# Patient Record
Sex: FEMALE
[De-identification: names, ages, dates, MRNs, and addresses within clinical notes are randomized per-mention and may not be internally consistent; named-entity substitution may affect disease eponyms.]

---

## 2023-08-15 ENCOUNTER — APPOINTMENT (OUTPATIENT)
Dept: ANTEPARTUM | Facility: CLINIC | Age: 23
End: 2023-08-15

## 2023-08-18 ENCOUNTER — ASOB RESULT (OUTPATIENT)
Age: 23
End: 2023-08-18

## 2023-08-18 ENCOUNTER — APPOINTMENT (OUTPATIENT)
Dept: ANTEPARTUM | Facility: CLINIC | Age: 23
End: 2023-08-18
Payer: MEDICAID

## 2023-08-18 PROBLEM — Z00.00 ENCOUNTER FOR PREVENTIVE HEALTH EXAMINATION: Status: ACTIVE | Noted: 2023-08-18

## 2023-08-18 PROCEDURE — 76811 OB US DETAILED SNGL FETUS: CPT

## 2023-10-13 ENCOUNTER — ASOB RESULT (OUTPATIENT)
Age: 23
End: 2023-10-13

## 2023-10-13 ENCOUNTER — APPOINTMENT (OUTPATIENT)
Dept: ANTEPARTUM | Facility: CLINIC | Age: 23
End: 2023-10-13
Payer: MEDICAID

## 2023-10-13 PROCEDURE — 76816 OB US FOLLOW-UP PER FETUS: CPT

## 2023-11-22 ENCOUNTER — APPOINTMENT (OUTPATIENT)
Dept: ANTEPARTUM | Facility: CLINIC | Age: 23
End: 2023-11-22
Payer: MEDICAID

## 2023-11-22 ENCOUNTER — ASOB RESULT (OUTPATIENT)
Age: 23
End: 2023-11-22

## 2023-11-22 PROCEDURE — 76816 OB US FOLLOW-UP PER FETUS: CPT

## 2023-12-05 ENCOUNTER — APPOINTMENT (OUTPATIENT)
Dept: ANTEPARTUM | Facility: CLINIC | Age: 23
End: 2023-12-05
Payer: MEDICAID

## 2023-12-05 ENCOUNTER — ASOB RESULT (OUTPATIENT)
Age: 23
End: 2023-12-05

## 2023-12-05 PROCEDURE — 76818 FETAL BIOPHYS PROFILE W/NST: CPT

## 2023-12-08 ENCOUNTER — INPATIENT (INPATIENT)
Facility: HOSPITAL | Age: 23
LOS: 2 days | Discharge: HOME | DRG: 560 | End: 2023-12-11
Attending: OBSTETRICS & GYNECOLOGY | Admitting: OBSTETRICS & GYNECOLOGY
Payer: MEDICAID

## 2023-12-08 VITALS — HEIGHT: 65 IN | WEIGHT: 255.07 LBS

## 2023-12-08 DIAGNOSIS — O26.899 OTHER SPECIFIED PREGNANCY RELATED CONDITIONS, UNSPECIFIED TRIMESTER: ICD-10-CM

## 2023-12-08 LAB
ABO RH CONFIRMATION: SIGNIFICANT CHANGE UP
ABO RH CONFIRMATION: SIGNIFICANT CHANGE UP
ALBUMIN SERPL ELPH-MCNC: 2.1 G/DL — LOW (ref 3.3–5)
ALBUMIN SERPL ELPH-MCNC: 2.1 G/DL — LOW (ref 3.3–5)
ALP SERPL-CCNC: 272 U/L — HIGH (ref 40–120)
ALP SERPL-CCNC: 272 U/L — HIGH (ref 40–120)
ALT FLD-CCNC: 218 U/L — HIGH (ref 12–78)
ALT FLD-CCNC: 218 U/L — HIGH (ref 12–78)
ANION GAP SERPL CALC-SCNC: 8 MMOL/L — SIGNIFICANT CHANGE UP (ref 5–17)
ANION GAP SERPL CALC-SCNC: 8 MMOL/L — SIGNIFICANT CHANGE UP (ref 5–17)
APTT BLD: 29.2 SEC — SIGNIFICANT CHANGE UP (ref 24.5–35.6)
APTT BLD: 29.2 SEC — SIGNIFICANT CHANGE UP (ref 24.5–35.6)
AST SERPL-CCNC: 103 U/L — HIGH (ref 15–37)
AST SERPL-CCNC: 103 U/L — HIGH (ref 15–37)
BASOPHILS # BLD AUTO: 0.04 K/UL — SIGNIFICANT CHANGE UP (ref 0–0.2)
BASOPHILS # BLD AUTO: 0.04 K/UL — SIGNIFICANT CHANGE UP (ref 0–0.2)
BASOPHILS NFR BLD AUTO: 0.5 % — SIGNIFICANT CHANGE UP (ref 0–2)
BASOPHILS NFR BLD AUTO: 0.5 % — SIGNIFICANT CHANGE UP (ref 0–2)
BILIRUB SERPL-MCNC: 0.5 MG/DL — SIGNIFICANT CHANGE UP (ref 0.2–1.2)
BILIRUB SERPL-MCNC: 0.5 MG/DL — SIGNIFICANT CHANGE UP (ref 0.2–1.2)
BUN SERPL-MCNC: 7 MG/DL — SIGNIFICANT CHANGE UP (ref 7–23)
BUN SERPL-MCNC: 7 MG/DL — SIGNIFICANT CHANGE UP (ref 7–23)
CALCIUM SERPL-MCNC: 8.7 MG/DL — SIGNIFICANT CHANGE UP (ref 8.5–10.1)
CALCIUM SERPL-MCNC: 8.7 MG/DL — SIGNIFICANT CHANGE UP (ref 8.5–10.1)
CHLORIDE SERPL-SCNC: 110 MMOL/L — HIGH (ref 96–108)
CHLORIDE SERPL-SCNC: 110 MMOL/L — HIGH (ref 96–108)
CO2 SERPL-SCNC: 21 MMOL/L — LOW (ref 22–31)
CO2 SERPL-SCNC: 21 MMOL/L — LOW (ref 22–31)
CREAT SERPL-MCNC: 0.73 MG/DL — SIGNIFICANT CHANGE UP (ref 0.5–1.3)
CREAT SERPL-MCNC: 0.73 MG/DL — SIGNIFICANT CHANGE UP (ref 0.5–1.3)
EGFR: 118 ML/MIN/1.73M2 — SIGNIFICANT CHANGE UP
EGFR: 118 ML/MIN/1.73M2 — SIGNIFICANT CHANGE UP
EOSINOPHIL # BLD AUTO: 0.11 K/UL — SIGNIFICANT CHANGE UP (ref 0–0.5)
EOSINOPHIL # BLD AUTO: 0.11 K/UL — SIGNIFICANT CHANGE UP (ref 0–0.5)
EOSINOPHIL NFR BLD AUTO: 1.3 % — SIGNIFICANT CHANGE UP (ref 0–6)
EOSINOPHIL NFR BLD AUTO: 1.3 % — SIGNIFICANT CHANGE UP (ref 0–6)
FIBRINOGEN PPP-MCNC: 709 MG/DL — HIGH (ref 200–435)
FIBRINOGEN PPP-MCNC: 709 MG/DL — HIGH (ref 200–435)
GLUCOSE SERPL-MCNC: 118 MG/DL — HIGH (ref 70–99)
GLUCOSE SERPL-MCNC: 118 MG/DL — HIGH (ref 70–99)
HCT VFR BLD CALC: 32 % — LOW (ref 34.5–45)
HCT VFR BLD CALC: 32 % — LOW (ref 34.5–45)
HGB BLD-MCNC: 10.7 G/DL — LOW (ref 11.5–15.5)
HGB BLD-MCNC: 10.7 G/DL — LOW (ref 11.5–15.5)
IMM GRANULOCYTES NFR BLD AUTO: 0.4 % — SIGNIFICANT CHANGE UP (ref 0–0.9)
IMM GRANULOCYTES NFR BLD AUTO: 0.4 % — SIGNIFICANT CHANGE UP (ref 0–0.9)
INR BLD: 0.85 RATIO — SIGNIFICANT CHANGE UP (ref 0.85–1.18)
INR BLD: 0.85 RATIO — SIGNIFICANT CHANGE UP (ref 0.85–1.18)
LYMPHOCYTES # BLD AUTO: 1.37 K/UL — SIGNIFICANT CHANGE UP (ref 1–3.3)
LYMPHOCYTES # BLD AUTO: 1.37 K/UL — SIGNIFICANT CHANGE UP (ref 1–3.3)
LYMPHOCYTES # BLD AUTO: 16.3 % — SIGNIFICANT CHANGE UP (ref 13–44)
LYMPHOCYTES # BLD AUTO: 16.3 % — SIGNIFICANT CHANGE UP (ref 13–44)
MCHC RBC-ENTMCNC: 27.7 PG — SIGNIFICANT CHANGE UP (ref 27–34)
MCHC RBC-ENTMCNC: 27.7 PG — SIGNIFICANT CHANGE UP (ref 27–34)
MCHC RBC-ENTMCNC: 33.4 GM/DL — SIGNIFICANT CHANGE UP (ref 32–36)
MCHC RBC-ENTMCNC: 33.4 GM/DL — SIGNIFICANT CHANGE UP (ref 32–36)
MCV RBC AUTO: 82.9 FL — SIGNIFICANT CHANGE UP (ref 80–100)
MCV RBC AUTO: 82.9 FL — SIGNIFICANT CHANGE UP (ref 80–100)
MONOCYTES # BLD AUTO: 0.36 K/UL — SIGNIFICANT CHANGE UP (ref 0–0.9)
MONOCYTES # BLD AUTO: 0.36 K/UL — SIGNIFICANT CHANGE UP (ref 0–0.9)
MONOCYTES NFR BLD AUTO: 4.3 % — SIGNIFICANT CHANGE UP (ref 2–14)
MONOCYTES NFR BLD AUTO: 4.3 % — SIGNIFICANT CHANGE UP (ref 2–14)
NEUTROPHILS # BLD AUTO: 6.47 K/UL — SIGNIFICANT CHANGE UP (ref 1.8–7.4)
NEUTROPHILS # BLD AUTO: 6.47 K/UL — SIGNIFICANT CHANGE UP (ref 1.8–7.4)
NEUTROPHILS NFR BLD AUTO: 77.2 % — HIGH (ref 43–77)
NEUTROPHILS NFR BLD AUTO: 77.2 % — HIGH (ref 43–77)
PLATELET # BLD AUTO: 334 K/UL — SIGNIFICANT CHANGE UP (ref 150–400)
PLATELET # BLD AUTO: 334 K/UL — SIGNIFICANT CHANGE UP (ref 150–400)
POTASSIUM SERPL-MCNC: 3.7 MMOL/L — SIGNIFICANT CHANGE UP (ref 3.5–5.3)
POTASSIUM SERPL-MCNC: 3.7 MMOL/L — SIGNIFICANT CHANGE UP (ref 3.5–5.3)
POTASSIUM SERPL-SCNC: 3.7 MMOL/L — SIGNIFICANT CHANGE UP (ref 3.5–5.3)
POTASSIUM SERPL-SCNC: 3.7 MMOL/L — SIGNIFICANT CHANGE UP (ref 3.5–5.3)
PROT SERPL-MCNC: 7.2 GM/DL — SIGNIFICANT CHANGE UP (ref 6–8.3)
PROT SERPL-MCNC: 7.2 GM/DL — SIGNIFICANT CHANGE UP (ref 6–8.3)
PROTHROM AB SERPL-ACNC: 9.7 SEC — SIGNIFICANT CHANGE UP (ref 9.5–13)
PROTHROM AB SERPL-ACNC: 9.7 SEC — SIGNIFICANT CHANGE UP (ref 9.5–13)
RBC # BLD: 3.86 M/UL — SIGNIFICANT CHANGE UP (ref 3.8–5.2)
RBC # BLD: 3.86 M/UL — SIGNIFICANT CHANGE UP (ref 3.8–5.2)
RBC # FLD: 13.4 % — SIGNIFICANT CHANGE UP (ref 10.3–14.5)
RBC # FLD: 13.4 % — SIGNIFICANT CHANGE UP (ref 10.3–14.5)
SODIUM SERPL-SCNC: 139 MMOL/L — SIGNIFICANT CHANGE UP (ref 135–145)
SODIUM SERPL-SCNC: 139 MMOL/L — SIGNIFICANT CHANGE UP (ref 135–145)
WBC # BLD: 8.38 K/UL — SIGNIFICANT CHANGE UP (ref 3.8–10.5)
WBC # BLD: 8.38 K/UL — SIGNIFICANT CHANGE UP (ref 3.8–10.5)
WBC # FLD AUTO: 8.38 K/UL — SIGNIFICANT CHANGE UP (ref 3.8–10.5)
WBC # FLD AUTO: 8.38 K/UL — SIGNIFICANT CHANGE UP (ref 3.8–10.5)

## 2023-12-08 PROCEDURE — 85384 FIBRINOGEN ACTIVITY: CPT

## 2023-12-08 PROCEDURE — 86901 BLOOD TYPING SEROLOGIC RH(D): CPT

## 2023-12-08 PROCEDURE — 86850 RBC ANTIBODY SCREEN: CPT

## 2023-12-08 PROCEDURE — 85730 THROMBOPLASTIN TIME PARTIAL: CPT

## 2023-12-08 PROCEDURE — 85014 HEMATOCRIT: CPT

## 2023-12-08 PROCEDURE — 94760 N-INVAS EAR/PLS OXIMETRY 1: CPT

## 2023-12-08 PROCEDURE — 85610 PROTHROMBIN TIME: CPT

## 2023-12-08 PROCEDURE — C1889: CPT

## 2023-12-08 PROCEDURE — 36415 COLL VENOUS BLD VENIPUNCTURE: CPT

## 2023-12-08 PROCEDURE — 86780 TREPONEMA PALLIDUM: CPT

## 2023-12-08 PROCEDURE — 80053 COMPREHEN METABOLIC PANEL: CPT

## 2023-12-08 PROCEDURE — 59050 FETAL MONITOR W/REPORT: CPT

## 2023-12-08 PROCEDURE — 88307 TISSUE EXAM BY PATHOLOGIST: CPT

## 2023-12-08 PROCEDURE — 85025 COMPLETE CBC W/AUTO DIFF WBC: CPT

## 2023-12-08 PROCEDURE — 86900 BLOOD TYPING SEROLOGIC ABO: CPT

## 2023-12-08 PROCEDURE — 85018 HEMOGLOBIN: CPT

## 2023-12-08 PROCEDURE — 99214 OFFICE O/P EST MOD 30 MIN: CPT

## 2023-12-08 RX ORDER — CHLORHEXIDINE GLUCONATE 213 G/1000ML
1 SOLUTION TOPICAL DAILY
Refills: 0 | Status: DISCONTINUED | OUTPATIENT
Start: 2023-12-08 | End: 2023-12-09

## 2023-12-08 RX ORDER — OXYTOCIN 10 UNIT/ML
VIAL (ML) INJECTION
Qty: 30 | Refills: 0 | Status: DISCONTINUED | OUTPATIENT
Start: 2023-12-08 | End: 2023-12-09

## 2023-12-08 RX ORDER — CITRIC ACID/SODIUM CITRATE 300-500 MG
30 SOLUTION, ORAL ORAL ONCE
Refills: 0 | Status: DISCONTINUED | OUTPATIENT
Start: 2023-12-08 | End: 2023-12-09

## 2023-12-08 RX ORDER — AMPICILLIN TRIHYDRATE 250 MG
1 CAPSULE ORAL EVERY 4 HOURS
Refills: 0 | Status: DISCONTINUED | OUTPATIENT
Start: 2023-12-08 | End: 2023-12-09

## 2023-12-08 RX ORDER — SODIUM CHLORIDE 9 MG/ML
1000 INJECTION, SOLUTION INTRAVENOUS
Refills: 0 | Status: DISCONTINUED | OUTPATIENT
Start: 2023-12-08 | End: 2023-12-09

## 2023-12-08 RX ORDER — AMPICILLIN TRIHYDRATE 250 MG
2 CAPSULE ORAL ONCE
Refills: 0 | Status: COMPLETED | OUTPATIENT
Start: 2023-12-08 | End: 2023-12-08

## 2023-12-08 RX ORDER — OXYTOCIN 10 UNIT/ML
333.33 VIAL (ML) INJECTION
Qty: 20 | Refills: 0 | Status: DISCONTINUED | OUTPATIENT
Start: 2023-12-08 | End: 2023-12-11

## 2023-12-08 RX ADMIN — SODIUM CHLORIDE 125 MILLILITER(S): 9 INJECTION, SOLUTION INTRAVENOUS at 21:05

## 2023-12-08 RX ADMIN — Medication 216 GRAM(S): at 21:04

## 2023-12-08 RX ADMIN — SODIUM CHLORIDE 125 MILLILITER(S): 9 INJECTION, SOLUTION INTRAVENOUS at 15:39

## 2023-12-08 NOTE — PATIENT PROFILE OB - BILL OF RIGHTS/ADMISSION INFORMATION PROVIDED TO:
Pt is requesting to speak with the nurse regarding an allergic reaction to the medication (Liptor) that was prescribed to her. Please call the pt back at [phone].      Patient

## 2023-12-08 NOTE — PATIENT PROFILE OB - PROVIDER NOTIFICATION
EXAM DATE/TIME:  2018 12:37 

 

This report includes an Addendum and supersedes previous reports for this exam.

 

 

This report includes an Addendum and supersedes previous reports for this exam.

 

 

 

 

[facility] COMPARISON:     

No previous studies available for comparison.

 

 

INDICATIONS :     

Nausea, vomiting and diarrhea x 3 weeks.  Diffuse abdominal pain.  Fever today.  20 pound weight loss
 in 3 weeks.

                      

 

IV CONTRAST:     

85 cc Omnipaque 350 (iohexol) IV 

 

 

ORAL CONTRAST:      

Partial prescribed oral contrast ingested.

                      

 

RADIATION DOSE:     

4.97 CTDIvol (mGy) 

 

 

MEDICAL HISTORY :     

Chronic obstructive pulmonary disease. Cardiovascular disease Colitis.  Asthma. 

 

SURGICAL HISTORY :      

Cholecystectomy.  section.Hernia repair. 

 

ENCOUNTER:      

Initial

 

ACUITY:      

3 weeks

 

PAIN SCALE:      

7/10

 

LOCATION:         

Diffuse abdomen.

 

TECHNIQUE:     

Volumetric scanning of the abdomen and pelvis was performed.  Using automated exposure control and ad
justment of the mA and/or kV according to patient size, radiation dose was kept as low as reasonably 
achievable to obtain optimal diagnostic quality images.  DICOM format image data is available electro
nically for review and comparison.  

 

FINDINGS:     

 

LOWER LUNGS:     

The visualized lower lungs are clear.

 

LIVER:     

Severe fatty infiltration. Liver is 19 cm craniocaudal. No focal hepatic lesion demonstrated. No duct
al dilatation. Previous cholecystectomy.  

 

SPLEEN:     

Normal size without lesion.

 

PANCREAS:     

Within normal limits.

 

KIDNEYS:     

Normal in size and shape.  There is no mass, stone or hydronephrosis.

 

ADRENAL GLANDS:     

Within normal limits.

 

VASCULAR:     

There is no aortic aneurysm.

 

BOWEL/MESENTERY:     

Cecum and distal ileum appear mildly indurated. 2 cm viscous seen posteromedial to the cecum and appe
ars to be blind ending and of concern for a markedly distended appendix. I don't see any free air. No
 bowel obstruction.

 

ABDOMINAL WALL:     

Within normal limits.

 

RETROPERITONEUM:     

There is no lymphadenopathy. 

 

BLADDER:     

No wall thickening or mass. 

 

REPRODUCTIVE:     

2.9 cm right adnexal cyst.

 

INGUINAL:     

There is no lymphadenopathy or hernia. 

 

MUSCULOSKELETAL:     

Within normal limits for patient age. 

 

CONCLUSION:     

1. Acute on chronic appendicitis suspected in the proper clinical setting; there is an indurated appe
aring fluid-filled viscus that appears to be blind ending posteromedial to the cecum. No free air see
n.

2. Enlarged and severely fatty infiltrated liver.

3. Right ovarian cyst.

 

 

 

 Familia Mcgrath MD on 2018 at 13:03           

Board Certified Radiologist.

 This report was verified electronically. 

 

ADDENDUM: 

 

Apparently patient has a normal white count and does not have other clinical features typical for william
endicitis. In looking the study over again, there is potentially a portion of a normal appendix seen 
inferior to the cecum on series 601 image 68 which would also mitigate against appendicitis. The blin
d ending pouch posteromedial to the cecum is best nonspecific but I believe abnormal, could be a Meck
el's diverticulum. There definitely seems to be some inflammatory change of it and the adjacent dista
l ileum, series 601 image 96. Crohn's disease with an adjacent abscess would also be conceivable but 
unlikely in the setting of a normal white count.

 

 Familia Mcgrath MD on 2018 at 13:44           

Board Certified Radiologist.

 This report was verified electronically. 

 

ADDENDUM: 

 

COMPARISON:     [facility], 2016, 19:07.

 

Delayed images were obtained through the pelvis. I believe the questionable area identified on the in
itial scan actually represents the decompressed cecum. On the current study, the cecum is well disten
ded with positive contrast. There is some impression from the 2.9 cm right ovarian cyst on the proxim
al ascending colon. No abscess identified.

 

 Malachi Smith MD on 2018 at 15:14           

Board Certified Radiologist.

 This report was verified electronically. Declines

## 2023-12-08 NOTE — PATIENT PROFILE OB - FALL HARM RISK - UNIVERSAL INTERVENTIONS
Bed in lowest position, wheels locked, appropriate side rails in place/Call bell, personal items and telephone in reach/Instruct patient to call for assistance before getting out of bed or chair/Non-slip footwear when patient is out of bed/[place] to call system/Physically safe environment - no spills, clutter or unnecessary equipment/Purposeful Proactive Rounding/Room/bathroom lighting operational, light cord in reach Bed in lowest position, wheels locked, appropriate side rails in place/Call bell, personal items and telephone in reach/Instruct patient to call for assistance before getting out of bed or chair/Non-slip footwear when patient is out of bed/[place] to call system/Physically safe environment - no spills, clutter or unnecessary equipment/Purposeful Proactive Rounding/Room/bathroom lighting operational, light cord in reach

## 2023-12-09 ENCOUNTER — RESULT REVIEW (OUTPATIENT)
Age: 23
End: 2023-12-09

## 2023-12-09 LAB
T PALLIDUM AB TITR SER: NEGATIVE — SIGNIFICANT CHANGE UP
T PALLIDUM AB TITR SER: NEGATIVE — SIGNIFICANT CHANGE UP

## 2023-12-09 PROCEDURE — 88307 TISSUE EXAM BY PATHOLOGIST: CPT | Mod: 26

## 2023-12-09 RX ORDER — DIPHENHYDRAMINE HCL 50 MG
25 CAPSULE ORAL EVERY 6 HOURS
Refills: 0 | Status: DISCONTINUED | OUTPATIENT
Start: 2023-12-09 | End: 2023-12-11

## 2023-12-09 RX ORDER — TETANUS TOXOID, REDUCED DIPHTHERIA TOXOID AND ACELLULAR PERTUSSIS VACCINE, ADSORBED 5; 2.5; 8; 8; 2.5 [IU]/.5ML; [IU]/.5ML; UG/.5ML; UG/.5ML; UG/.5ML
0.5 SUSPENSION INTRAMUSCULAR ONCE
Refills: 0 | Status: DISCONTINUED | OUTPATIENT
Start: 2023-12-09 | End: 2023-12-11

## 2023-12-09 RX ORDER — SODIUM CHLORIDE 9 MG/ML
3 INJECTION INTRAMUSCULAR; INTRAVENOUS; SUBCUTANEOUS EVERY 8 HOURS
Refills: 0 | Status: DISCONTINUED | OUTPATIENT
Start: 2023-12-09 | End: 2023-12-10

## 2023-12-09 RX ORDER — DIBUCAINE 1 %
1 OINTMENT (GRAM) RECTAL EVERY 6 HOURS
Refills: 0 | Status: DISCONTINUED | OUTPATIENT
Start: 2023-12-09 | End: 2023-12-11

## 2023-12-09 RX ORDER — OXYCODONE HYDROCHLORIDE 5 MG/1
5 TABLET ORAL ONCE
Refills: 0 | Status: DISCONTINUED | OUTPATIENT
Start: 2023-12-09 | End: 2023-12-11

## 2023-12-09 RX ORDER — KETOROLAC TROMETHAMINE 30 MG/ML
30 SYRINGE (ML) INJECTION ONCE
Refills: 0 | Status: DISCONTINUED | OUTPATIENT
Start: 2023-12-09 | End: 2023-12-09

## 2023-12-09 RX ORDER — MAGNESIUM HYDROXIDE 400 MG/1
30 TABLET, CHEWABLE ORAL
Refills: 0 | Status: DISCONTINUED | OUTPATIENT
Start: 2023-12-09 | End: 2023-12-11

## 2023-12-09 RX ORDER — PRAMOXINE HYDROCHLORIDE 150 MG/15G
1 AEROSOL, FOAM RECTAL EVERY 4 HOURS
Refills: 0 | Status: DISCONTINUED | OUTPATIENT
Start: 2023-12-09 | End: 2023-12-11

## 2023-12-09 RX ORDER — LANOLIN
1 OINTMENT (GRAM) TOPICAL EVERY 6 HOURS
Refills: 0 | Status: DISCONTINUED | OUTPATIENT
Start: 2023-12-09 | End: 2023-12-11

## 2023-12-09 RX ORDER — ACETAMINOPHEN 500 MG
975 TABLET ORAL
Refills: 0 | Status: DISCONTINUED | OUTPATIENT
Start: 2023-12-09 | End: 2023-12-11

## 2023-12-09 RX ORDER — OXYTOCIN 10 UNIT/ML
41.67 VIAL (ML) INJECTION
Qty: 20 | Refills: 0 | Status: DISCONTINUED | OUTPATIENT
Start: 2023-12-09 | End: 2023-12-11

## 2023-12-09 RX ORDER — OXYCODONE HYDROCHLORIDE 5 MG/1
5 TABLET ORAL
Refills: 0 | Status: DISCONTINUED | OUTPATIENT
Start: 2023-12-09 | End: 2023-12-11

## 2023-12-09 RX ORDER — IBUPROFEN 200 MG
600 TABLET ORAL EVERY 6 HOURS
Refills: 0 | Status: DISCONTINUED | OUTPATIENT
Start: 2023-12-09 | End: 2023-12-11

## 2023-12-09 RX ORDER — IBUPROFEN 200 MG
600 TABLET ORAL EVERY 6 HOURS
Refills: 0 | Status: COMPLETED | OUTPATIENT
Start: 2023-12-09 | End: 2024-11-06

## 2023-12-09 RX ORDER — BENZOCAINE 10 %
1 GEL (GRAM) MUCOUS MEMBRANE EVERY 6 HOURS
Refills: 0 | Status: DISCONTINUED | OUTPATIENT
Start: 2023-12-09 | End: 2023-12-11

## 2023-12-09 RX ORDER — HYDROCORTISONE 1 %
1 OINTMENT (GRAM) TOPICAL EVERY 6 HOURS
Refills: 0 | Status: DISCONTINUED | OUTPATIENT
Start: 2023-12-09 | End: 2023-12-11

## 2023-12-09 RX ORDER — SIMETHICONE 80 MG/1
80 TABLET, CHEWABLE ORAL EVERY 4 HOURS
Refills: 0 | Status: DISCONTINUED | OUTPATIENT
Start: 2023-12-09 | End: 2023-12-11

## 2023-12-09 RX ORDER — AER TRAVELER 0.5 G/1
1 SOLUTION RECTAL; TOPICAL EVERY 4 HOURS
Refills: 0 | Status: DISCONTINUED | OUTPATIENT
Start: 2023-12-09 | End: 2023-12-11

## 2023-12-09 RX ADMIN — Medication 108 GRAM(S): at 01:18

## 2023-12-09 RX ADMIN — Medication 108 GRAM(S): at 09:30

## 2023-12-09 RX ADMIN — Medication 975 MILLIGRAM(S): at 21:15

## 2023-12-09 RX ADMIN — SODIUM CHLORIDE 3 MILLILITER(S): 9 INJECTION INTRAMUSCULAR; INTRAVENOUS; SUBCUTANEOUS at 22:00

## 2023-12-09 RX ADMIN — Medication 30 MILLIGRAM(S): at 12:57

## 2023-12-09 RX ADMIN — Medication 2 MILLIUNIT(S)/MIN: at 00:08

## 2023-12-09 RX ADMIN — Medication 975 MILLIGRAM(S): at 20:37

## 2023-12-09 RX ADMIN — Medication 30 MILLIGRAM(S): at 12:15

## 2023-12-09 RX ADMIN — Medication 108 GRAM(S): at 05:25

## 2023-12-10 ENCOUNTER — TRANSCRIPTION ENCOUNTER (OUTPATIENT)
Age: 23
End: 2023-12-10

## 2023-12-10 LAB
HCT VFR BLD CALC: 31.4 % — LOW (ref 34.5–45)
HCT VFR BLD CALC: 31.4 % — LOW (ref 34.5–45)
HGB BLD-MCNC: 10.2 G/DL — LOW (ref 11.5–15.5)
HGB BLD-MCNC: 10.2 G/DL — LOW (ref 11.5–15.5)

## 2023-12-10 RX ORDER — IBUPROFEN 200 MG
1 TABLET ORAL
Qty: 20 | Refills: 0
Start: 2023-12-10 | End: 2023-12-14

## 2023-12-10 RX ORDER — ACETAMINOPHEN 500 MG
2 TABLET ORAL
Qty: 40 | Refills: 0
Start: 2023-12-10 | End: 2023-12-14

## 2023-12-10 RX ADMIN — Medication 600 MILLIGRAM(S): at 00:40

## 2023-12-10 RX ADMIN — Medication 1 TABLET(S): at 16:12

## 2023-12-10 RX ADMIN — Medication 975 MILLIGRAM(S): at 04:00

## 2023-12-10 RX ADMIN — Medication 600 MILLIGRAM(S): at 00:10

## 2023-12-10 RX ADMIN — Medication 1 SPRAY(S): at 09:49

## 2023-12-10 RX ADMIN — Medication 975 MILLIGRAM(S): at 03:03

## 2023-12-10 RX ADMIN — Medication 975 MILLIGRAM(S): at 20:51

## 2023-12-10 RX ADMIN — Medication 975 MILLIGRAM(S): at 10:45

## 2023-12-10 RX ADMIN — Medication 600 MILLIGRAM(S): at 16:10

## 2023-12-10 RX ADMIN — Medication 600 MILLIGRAM(S): at 06:48

## 2023-12-10 RX ADMIN — Medication 600 MILLIGRAM(S): at 07:30

## 2023-12-10 RX ADMIN — Medication 600 MILLIGRAM(S): at 18:01

## 2023-12-10 RX ADMIN — Medication 975 MILLIGRAM(S): at 21:15

## 2023-12-10 RX ADMIN — SODIUM CHLORIDE 3 MILLILITER(S): 9 INJECTION INTRAMUSCULAR; INTRAVENOUS; SUBCUTANEOUS at 06:00

## 2023-12-10 RX ADMIN — Medication 975 MILLIGRAM(S): at 09:48

## 2023-12-10 NOTE — DISCHARGE NOTE OB - MEDICATION SUMMARY - MEDICATIONS TO TAKE
I will START or STAY ON the medications listed below when I get home from the hospital:    ibuprofen 600 mg oral tablet  -- 1 tab(s) by mouth every 6 hours  -- Indication: For Pain    acetaminophen 500 mg oral tablet  -- 2 tab(s) by mouth every 6 hours  -- Indication: For Pain

## 2023-12-10 NOTE — DISCHARGE NOTE OB - NS MD DC FALL RISK RISK
For information on Fall & Injury Prevention, visit: [email or web address] OR  [email or web address] OR  https://www.cdc.gov/steadi/patient.html For information on Fall & Injury Prevention, visit: [email or web address] OR  [email or web address] OR  https://www.cdc.gov/steadi/patient.html

## 2023-12-10 NOTE — DISCHARGE NOTE OB - PROVIDER TOKENS
PROVIDER:[TOKEN:[[id number]],FOLLOWUP:[1 month]] PROVIDER:[TOKEN:[[id number]],FOLLOWUP:[1 month]]

## 2023-12-10 NOTE — PROGRESS NOTE ADULT - ASSESSMENT
A/P:  EDUARDO MAN is a 23y  PPD#1 s/p spontaneous vaginal delivery at 36w5d for suspected IHCP.-Vital signs stable  -Hgb: 10.7 -> AM labs pending   -Voiding, tolerating PO, bowel function nml   -Advance care as tolerated   -Continue routine postpartum care and education  -Healthy infant  -Dispo: Continue inpatient management

## 2023-12-10 NOTE — DISCHARGE NOTE OB - PATIENT PORTAL LINK FT
You can access the FollowMyHealth Patient Portal offered by [facility] by registering at the following website: [email or web address]. By joining [facility]’s FollowMyHealth portal, you will also be able to view your health information using other applications (apps) compatible with our system. You can access the FollowMyHealth Patient Portal offered by [facility] by registering at the following website: [email or web address]. By joining [facility]’s FollowMyHealth portal, you will also be able to view your health information using other applications (apps) compatible with our system.

## 2023-12-10 NOTE — PROGRESS NOTE ADULT - SUBJECTIVE AND OBJECTIVE BOX
EDUARDO MAN is a 23y  PPD#1 s/p spontaneous vaginal delivery at 36w5d for suspected IHCP.    S:    No acute events overnight.   The patient has no complaints.  Pain controlled with current treatment regimen.   She is ambulating without difficulty and tolerating PO.   + flatus/-BM/+ voiding   She endorses appropriate lochia, which is decreasing.   She is breastfeeding/bottle feeding.   She denies fevers, chills, nausea and vomiting.   She denies lightheadedness, dizziness, palpitations, chest pain and SOB.     O:    T(C): 36.8 (12-10-23 @ 00:13), Max: 36.8 (23 @ 11:20)  HR: 55 (12-10-23 @ 00:) (55 - 62)  BP: 107/78 (12-10-23 @ 00:13) (103/74 - 141/94)  RR: 18 (12-10-23 @ 00:13) (17 - 18)  SpO2: 98% (12-10-23 @ 00:13) (98% - 100%)    Gen: NAD, AOx3  Pulm: Resting comfortably on room air  Abdomen:  Soft, non-tender, non-distended  Uterus:  Fundus firm below umbilicus  VE:  Expectant lochia  Ext:  Non-tender and non-edematous                          10.7   8.38  )-----------( 334      ( 08 Dec 2023 15:16 )             32.0         139  |  110<H>  |  7   ----------------------------<  118<H>  3.7   |  21<L>  |  0.73    Ca    8.7      08 Dec 2023 15:16    TPro  7.2  /  Alb  2.1<L>  /  TBili  0.5  /  DBili  x   /  AST  103<H>  /  ALT  218<H>  /  AlkPhos  272<H>

## 2023-12-10 NOTE — DISCHARGE NOTE OB - CARE PROVIDER_API CALL
Caryn Rdz  Obstetrics and Gynecology  [address] [place] [address]  Phone: [phone]  Fax: [phone]  Follow Up Time: 1 month   Caryn Rdz  Obstetrics and Gynecology  [address] [place] [address]  Phone: [phone]  Fax: [phone]  Follow Up Time: 1 month

## 2023-12-10 NOTE — DISCHARGE NOTE OB - HOSPITAL COURSE
36w5d, suspected IHCP. Postpartum pain is well controlled with PRN medication. She has no difficulty with ambulation, voiding or PO intake. Lab values and vital signs are within normal limits prior to discharge.      36w5d, suspected IHCP. Postpartum pain is well controlled with PRN medication. She has no difficulty with ambulation, voiding or PO intake. Lab values and vital signs are within normal limits prior to discharge.  Dc'd home with micronor at patients request.

## 2023-12-11 VITALS
HEART RATE: 96 BPM | SYSTOLIC BLOOD PRESSURE: 133 MMHG | RESPIRATION RATE: 18 BRPM | TEMPERATURE: 98 F | DIASTOLIC BLOOD PRESSURE: 76 MMHG | OXYGEN SATURATION: 99 %

## 2023-12-11 LAB
ALBUMIN SERPL ELPH-MCNC: 2.2 G/DL — LOW (ref 3.3–5)
ALBUMIN SERPL ELPH-MCNC: 2.2 G/DL — LOW (ref 3.3–5)
ALP SERPL-CCNC: 229 U/L — HIGH (ref 40–120)
ALP SERPL-CCNC: 229 U/L — HIGH (ref 40–120)
ALT FLD-CCNC: 213 U/L — HIGH (ref 12–78)
ALT FLD-CCNC: 213 U/L — HIGH (ref 12–78)
ANION GAP SERPL CALC-SCNC: 8 MMOL/L — SIGNIFICANT CHANGE UP (ref 5–17)
ANION GAP SERPL CALC-SCNC: 8 MMOL/L — SIGNIFICANT CHANGE UP (ref 5–17)
AST SERPL-CCNC: 102 U/L — HIGH (ref 15–37)
AST SERPL-CCNC: 102 U/L — HIGH (ref 15–37)
BILIRUB SERPL-MCNC: 0.5 MG/DL — SIGNIFICANT CHANGE UP (ref 0.2–1.2)
BILIRUB SERPL-MCNC: 0.5 MG/DL — SIGNIFICANT CHANGE UP (ref 0.2–1.2)
BUN SERPL-MCNC: 12 MG/DL — SIGNIFICANT CHANGE UP (ref 7–23)
BUN SERPL-MCNC: 12 MG/DL — SIGNIFICANT CHANGE UP (ref 7–23)
CALCIUM SERPL-MCNC: 8.9 MG/DL — SIGNIFICANT CHANGE UP (ref 8.5–10.1)
CALCIUM SERPL-MCNC: 8.9 MG/DL — SIGNIFICANT CHANGE UP (ref 8.5–10.1)
CHLORIDE SERPL-SCNC: 110 MMOL/L — HIGH (ref 96–108)
CHLORIDE SERPL-SCNC: 110 MMOL/L — HIGH (ref 96–108)
CO2 SERPL-SCNC: 20 MMOL/L — LOW (ref 22–31)
CO2 SERPL-SCNC: 20 MMOL/L — LOW (ref 22–31)
CREAT SERPL-MCNC: 0.81 MG/DL — SIGNIFICANT CHANGE UP (ref 0.5–1.3)
CREAT SERPL-MCNC: 0.81 MG/DL — SIGNIFICANT CHANGE UP (ref 0.5–1.3)
EGFR: 105 ML/MIN/1.73M2 — SIGNIFICANT CHANGE UP
EGFR: 105 ML/MIN/1.73M2 — SIGNIFICANT CHANGE UP
GLUCOSE SERPL-MCNC: 147 MG/DL — HIGH (ref 70–99)
GLUCOSE SERPL-MCNC: 147 MG/DL — HIGH (ref 70–99)
POTASSIUM SERPL-MCNC: 3.5 MMOL/L — SIGNIFICANT CHANGE UP (ref 3.5–5.3)
POTASSIUM SERPL-MCNC: 3.5 MMOL/L — SIGNIFICANT CHANGE UP (ref 3.5–5.3)
POTASSIUM SERPL-SCNC: 3.5 MMOL/L — SIGNIFICANT CHANGE UP (ref 3.5–5.3)
POTASSIUM SERPL-SCNC: 3.5 MMOL/L — SIGNIFICANT CHANGE UP (ref 3.5–5.3)
PROT SERPL-MCNC: 7 GM/DL — SIGNIFICANT CHANGE UP (ref 6–8.3)
PROT SERPL-MCNC: 7 GM/DL — SIGNIFICANT CHANGE UP (ref 6–8.3)
SODIUM SERPL-SCNC: 138 MMOL/L — SIGNIFICANT CHANGE UP (ref 135–145)
SODIUM SERPL-SCNC: 138 MMOL/L — SIGNIFICANT CHANGE UP (ref 135–145)

## 2023-12-11 RX ADMIN — Medication 600 MILLIGRAM(S): at 17:51

## 2023-12-11 RX ADMIN — Medication 1 TABLET(S): at 08:18

## 2023-12-11 RX ADMIN — Medication 600 MILLIGRAM(S): at 00:31

## 2023-12-11 RX ADMIN — Medication 600 MILLIGRAM(S): at 11:57

## 2023-12-11 RX ADMIN — Medication 600 MILLIGRAM(S): at 07:00

## 2023-12-11 RX ADMIN — Medication 975 MILLIGRAM(S): at 02:27

## 2023-12-11 RX ADMIN — Medication 600 MILLIGRAM(S): at 06:15

## 2023-12-11 RX ADMIN — Medication 600 MILLIGRAM(S): at 01:00

## 2023-12-11 RX ADMIN — Medication 975 MILLIGRAM(S): at 03:00

## 2023-12-11 NOTE — PROGRESS NOTE ADULT - ATTENDING COMMENTS
23y  PPD#1 s/p spontaneous vaginal delivery at 36w5d for suspected IHCP.    S:    No acute events overnight.   The patient has no complaints.  Pain controlled with current treatment regimen.   She is ambulating without difficulty and tolerating PO.   + flatus/-BM/+ voiding   She endorses appropriate lochia, which is decreasing.   She is breastfeeding/bottle feeding.   She denies fevers, chills, nausea and vomiting.   She denies lightheadedness, dizziness, palpitations, chest pain and SOB.    23y  PPD#1 s/p spontaneous vaginal delivery at 36w5d for suspected IHCP.-Vital signs stable  -Hgb: 10.7 -> AM labs pending   -Voiding, tolerating PO, bowel function nml   -Advance care as tolerated   -Continue routine postpartum care and education  -Healthy male infant - s/p circumcision  -continue postpartum care
Pt seen and examined.  agree with note above.  pt doing well  routine PP care.  Pt is stable for dc  pt desires micronor for birth control.    PLEE

## 2023-12-11 NOTE — PROGRESS NOTE ADULT - ASSESSMENT
A/P:  Patient is a 22yo F  PPD#2 s/p spontaneous vaginal delivery at 36w5d for suspected IHCP.    -Vital signs stable  -Postpartum H&H stable  -Voiding, tolerating PO, bowel function nml   -Advance care as tolerated   -Continue routine postpartum care and education.  -male infant s/p circumcision.  -stable for d/c today A/P:  Patient is a 22yo F  PPD#2 s/p spontaneous vaginal delivery at 36w5d for suspected IHCP.    -Vital signs stable  -Postpartum H&H stable  -Voiding, tolerating PO, bowel function nml   -Advance care as tolerated   - hold tylenol due to IHCP  -Continue routine postpartum care and education.  -male infant s/p circumcision.  -stable for d/c today A/P:  Patient is a 24yo F  PPD#2 s/p spontaneous vaginal delivery at 36w5d for suspected IHCP.    -Vital signs stable  -Postpartum H&H stable  -Voiding, tolerating PO, bowel function nml   -Advance care as tolerated   - hold tylenol due to IHCP  -Continue routine postpartum care and education.  -male infant s/p circumcision.  -stable for d/c today A/P:  Patient is a 22yo F  PPD#2 s/p spontaneous vaginal delivery at 36w5d for suspected IHCP.    -Vital signs stable  -Postpartum H&H stable  -Voiding, tolerating PO, bowel function nml   -Advance care as tolerated   - hold tylenol due to suspected IHCP  -Continue routine postpartum care and education.  -male infant s/p circumcision.  -stable for d/c today A/P:  Patient is a 24yo F  PPD#2 s/p spontaneous vaginal delivery at 36w5d for suspected IHCP.    -Vital signs stable  -Postpartum H&H stable  -Voiding, tolerating PO, bowel function nml   -Advance care as tolerated   - hold tylenol due to suspected IHCP  -Continue routine postpartum care and education.  -male infant s/p circumcision.  -stable for d/c today A/P:  Patient is a 24yo F  PPD#2 s/p spontaneous vaginal delivery at 36w5d for suspected IHCP.    -Vital signs stable  -Postpartum H&H stable  -Voiding, tolerating PO, bowel function nml   -Advance care as tolerated   - hold tylenol due to suspected IHCP  - f/u repeat CMP  -Continue routine postpartum care and education.  -male infant s/p circumcision.  -stable for d/c today A/P:  Patient is a 22yo F  PPD#2 s/p spontaneous vaginal delivery at 36w5d for suspected IHCP.    -Vital signs stable  -Postpartum H&H stable  -Voiding, tolerating PO, bowel function nml   -Advance care as tolerated   - hold tylenol due to suspected IHCP  - f/u repeat CMP  -Continue routine postpartum care and education.  -male infant s/p circumcision.  -stable for d/c today A/P:  Patient is a 24yo F  PPD#2 s/p spontaneous vaginal delivery at 36w5d for suspected IHCP.    -Vital signs stable  -Postpartum H&H stable  -Voiding, tolerating PO, bowel function nml   -Advance care as tolerated   - hold tylenol due to suspected IHCP  - f/u repeat CMP  -Continue routine postpartum care and education.  -male infant s/p circumcision.  - possible d/c today A/P:  Patient is a 22yo F  PPD#2 s/p spontaneous vaginal delivery at 36w5d for suspected IHCP.    -Vital signs stable  -Postpartum H&H stable  -Voiding, tolerating PO, bowel function nml   -Advance care as tolerated   - hold tylenol due to suspected IHCP  - f/u repeat CMP  -Continue routine postpartum care and education.  -male infant s/p circumcision.  - possible d/c today

## 2023-12-11 NOTE — PROGRESS NOTE ADULT - SUBJECTIVE AND OBJECTIVE BOX
EDUARDO MAN is a 24yo  PPD#2 s/p spontaneous vaginal delivery at 36w5d for suspected IHCP.      S:    No acute events overnight.   The patient has no complaints.  Pain controlled with current treatment regimen.   She is ambulating without difficulty and tolerating PO.   + flatus/-BM/+voiding  She endorses appropriate lochia, which is decreasing.   She denies fevers, chills, nausea and vomiting.   She denies HA, blurry vision, lightheadedness, dizziness, palpitations, chest pain and SOB.     O:    T(C): 36.7 (23 @ 00:36), Max: 36.7 (23 @ 00:36)  HR: 84 (23 @ 00:36) (57 - 84)  BP: 102/58 (- @ 00:36) (100/63 - 103/74)  RR: 16 (23 @ 00:36) (16 - 17)  SpO2: 98% (23 @ 00:36) (97% - 99%)    Gen: NAD, AOx3  CV: RRR, S1/S2 present  Pulm: CTAB  Breast: nontender, non-engorged   Abdomen:  soft, non-tender, non-distended, +bowel sounds.  Uterus:  Fundus firm below umbilicus  VE:  +lochia  Ext:  Non-tender.                          10.2   x     )-----------( x        ( 10 Dec 2023 10:05 )             31.4             A/P:  Patient is a 24yo GP now PPD# s/p spontaneous vaginal delivery at ** weeks gestation  -Vital signs stable  -Postpartum H&H stable/pending  -Voiding, tolerating PO, bowel function nml   -Advance care as tolerated   -Continue routine postpartum care and education.  -Healthy male infant, desires/declines circumcision.   EDUARDO MAN is a 24yo  PPD#2 s/p spontaneous vaginal delivery at 36w5d for suspected IHCP.      S:    No acute events overnight.   The patient only complains of moderate back and vaginal pain at this time.  Pain well controlled with current treatment regimen.   She is ambulating without difficulty and tolerating PO.   + flatus/+BM/+voiding  She endorses appropriate lochia, which is decreasing.   She denies fevers, chills, nausea and vomiting.   She denies HA, blurry vision, lightheadedness, dizziness, palpitations, chest pain and SOB.     O:    T(C): 36.7 (23 @ 00:36), Max: 36.7 (23 @ 00:36)  HR: 84 (23 @ 00:36) (57 - 84)  BP: 102/58 (23 @ 00:36) (100/63 - 103/74)  RR: 16 (23 @ 00:36) (16 - 17)  SpO2: 98% (23 @ 00:36) (97% - 99%)    Gen: NAD, AOx3  CV: RRR, S1/S2 present  Pulm: CTAB  Breast: nontender, non-engorged   Abdomen:  soft, non-tender, non-distended, +bowel sounds.  Uterus:  Fundus firm below umbilicus  VE:  +lochia  Ext:  Non-tender.                          10.2   x     )-----------( x        ( 10 Dec 2023 10:05 )             31.4        EDUARDO MAN is a 22yo  PPD#2 s/p spontaneous vaginal delivery at 36w5d for suspected IHCP.      S:    No acute events overnight.   The patient only complains of moderate back and vaginal pain at this time.  Pain well controlled with current treatment regimen.   She is ambulating without difficulty and tolerating PO.   + flatus/+BM/+voiding  She endorses appropriate lochia, which is decreasing.   She denies fevers, chills, nausea and vomiting.   She denies HA, blurry vision, lightheadedness, dizziness, palpitations, chest pain and SOB.     O:    T(C): 36.7 (23 @ 00:36), Max: 36.7 (23 @ 00:36)  HR: 84 (23 @ 00:36) (57 - 84)  BP: 102/58 (23 @ 00:36) (100/63 - 103/74)  RR: 16 (23 @ 00:36) (16 - 17)  SpO2: 98% (23 @ 00:36) (97% - 99%)    Gen: NAD, AOx3  CV: RRR, S1/S2 present  Pulm: CTAB  Breast: nontender, non-engorged   Abdomen:  soft, non-tender, non-distended, +bowel sounds.  Uterus:  Fundus firm below umbilicus  VE:  +lochia  Ext:  Non-tender.                          10.2   x     )-----------( x        ( 10 Dec 2023 10:05 )             31.4

## 2023-12-12 ENCOUNTER — APPOINTMENT (OUTPATIENT)
Dept: ANTEPARTUM | Facility: CLINIC | Age: 23
End: 2023-12-12

## 2023-12-14 DIAGNOSIS — Z3A.36 36 WEEKS GESTATION OF PREGNANCY: ICD-10-CM

## 2023-12-14 DIAGNOSIS — Z28.09 IMMUNIZATION NOT CARRIED OUT BECAUSE OF OTHER CONTRAINDICATION: ICD-10-CM

## 2023-12-14 DIAGNOSIS — O26.643 INTRAHEPATIC CHOLESTASIS OF PREGNANCY, THIRD TRIMESTER: ICD-10-CM

## 2023-12-14 DIAGNOSIS — J45.909 UNSPECIFIED ASTHMA, UNCOMPLICATED: ICD-10-CM

## 2023-12-19 ENCOUNTER — APPOINTMENT (OUTPATIENT)
Dept: ANTEPARTUM | Facility: CLINIC | Age: 23
End: 2023-12-19

## 2023-12-26 ENCOUNTER — APPOINTMENT (OUTPATIENT)
Dept: ANTEPARTUM | Facility: CLINIC | Age: 23
End: 2023-12-26

## 2023-12-29 LAB
SURGICAL PATHOLOGY STUDY: SIGNIFICANT CHANGE UP
SURGICAL PATHOLOGY STUDY: SIGNIFICANT CHANGE UP

## 2024-01-02 ENCOUNTER — APPOINTMENT (OUTPATIENT)
Dept: ANTEPARTUM | Facility: CLINIC | Age: 24
End: 2024-01-02

## 2024-08-11 ENCOUNTER — EMERGENCY (EMERGENCY)
Facility: HOSPITAL | Age: 24
LOS: 0 days | Discharge: HOME | End: 2024-08-12
Attending: EMERGENCY MEDICINE
Payer: MEDICAID

## 2024-08-11 VITALS
TEMPERATURE: 98 F | DIASTOLIC BLOOD PRESSURE: 88 MMHG | HEART RATE: 67 BPM | SYSTOLIC BLOOD PRESSURE: 123 MMHG | RESPIRATION RATE: 20 BRPM | OXYGEN SATURATION: 99 %

## 2024-08-11 VITALS — HEIGHT: 63 IN | WEIGHT: 175.05 LBS

## 2024-08-11 DIAGNOSIS — M25.521 PAIN IN RIGHT ELBOW: ICD-10-CM
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
## 2024-08-11 DIAGNOSIS — S52.121A DISPLACED FRACTURE OF HEAD OF RIGHT RADIUS, INITIAL ENCOUNTER FOR CLOSED FRACTURE: ICD-10-CM

## 2024-08-11 PROCEDURE — 99285 EMERGENCY DEPT VISIT HI MDM: CPT

## 2024-08-11 PROCEDURE — 73060 X-RAY EXAM OF HUMERUS: CPT | Mod: 26,RT

## 2024-08-11 PROCEDURE — 73090 X-RAY EXAM OF FOREARM: CPT | Mod: 26,RT

## 2024-08-11 PROCEDURE — 76376 3D RENDER W/INTRP POSTPROCES: CPT | Mod: 26

## 2024-08-11 PROCEDURE — 73080 X-RAY EXAM OF ELBOW: CPT | Mod: 26,RT

## 2024-08-11 PROCEDURE — 73200 CT UPPER EXTREMITY W/O DYE: CPT | Mod: 26,RT,MC

## 2024-08-11 RX ORDER — IBUPROFEN 200 MG
600 TABLET ORAL ONCE
Refills: 0 | Status: COMPLETED | OUTPATIENT
Start: 2024-08-11 | End: 2024-08-11

## 2024-08-11 RX ADMIN — Medication 600 MILLIGRAM(S): at 20:29

## 2024-08-11 NOTE — ED ADULT NURSE NOTE - NSFALLRISKINTERV_ED_ALL_ED

## 2024-08-11 NOTE — ED STATDOCS - OBJECTIVE STATEMENT
22 y/o female w/ no PMHx. Pt is presenting to the ED c/o arm pain s/p mechanical slip and fall. Pt denies HS, LOC, and AC. Pt reports that she tripped and fell and landed on/injured R arm 1 hour ago. Pt took Tylenol w/o relief. Pain is mostly in R forearm an R elbow. Pt bumped face, but denies any LOC or abrasions. Pt rubbed muscle cream on R elbow w/ no relief. Pt reports decreased ROM of R elbow due to pain. Pt denies numbness, tingling, or weakness. Pt has no history of any arm fracture.

## 2024-08-11 NOTE — ED STATDOCS - ATTENDING APP SHARED VISIT CONTRIBUTION OF CARE
Attending Contribution to Care: I, Brenda Rodriguez, performed the initial face to face bedside interview with this patient regarding history of present illness, review of symptoms and relevant past medical, social and family history.  I completed an independent physical examination.  I was the initial provider who evaluated this patient. I have signed out the follow up of any pending tests (i.e. labs, radiological studies) to the ACP.  I have communicated the patient’s plan of care and disposition with the ACP.

## 2024-08-11 NOTE — ED STATDOCS - CARE PROVIDER_API CALL
Arnel Puente  Orthopaedic Surgery  [address] [place] [address]  Phone: [phone]  Fax: [phone]  Follow Up Time:

## 2024-08-11 NOTE — ED STATDOCS - MUSCULOSKELETAL FINDINGS, MLM
Tender on the medial side r elbow. Pain w/ supination of r forearm. Arm is held in flexion of the elbow./RANGE OF MOTION LIMITED/TENDERNESS

## 2024-08-11 NOTE — ED STATDOCS - NSFOLLOWUPINSTRUCTIONS_ED_ALL_ED_FT
Keep splint in place.   you MUST see Dr. Puente in office within 24 hours.   Please call Dr. Puente to see within 24 hours.   Take ibuprofen 600mg every 6h ours as needed for pain.    Fractura de la carlos alberto del radio  Radial Head Fracture  Bones and nerves of the arm, with a close-up showing a type 3 fracture in the radial head.   La fractura de la carlos alberto del radio es shun rotura en tracy de los huesos del antebrazo (radio) cerca de la articulación del codo. El antebrazo tiene dos huesos. El radio es el hueso que está del lado del pulgar.    Hay diferentes tipos de fracturas de la carlos alberto del radio. El tipo se determina por la cantidad de movimiento (desplazamiento) de los huesos de fuentes posiciones normales:  Tipo 1. Se trata de shun fractura pequeña en la que las partes del hueso permanecen juntas (fractura sin desplazamiento).  Tipo 2. La fractura es moderada, y las partes del hueso están ligeramente desplazadas.  Tipo 3. Hay fracturas múltiples y partes del hueso desplazadas.  ¿Cuáles son las causas?  Esta afección a menudo ocurre al caerse sobre el brazo extendido.    ¿Qué incrementa el riesgo?  Es más probable que desarrolle esta afección si:  Es shun amara mayor.  Practica deportes que tienen más probabilidades de provocar caídas mientras corre o salta.  Tiene huesos débiles (osteoporosis).  ¿Cuáles son los signos o síntomas?  Los síntomas de esta afección incluyen:  Hinchazón de la articulación del codo.  Dolor y dificultad al  la articulación del codo, alla al extender el codo o rotar el antebrazo.  ¿Cómo se diagnostica?  Esta afección se diagnostica en función de fuentes antecedentes médicos y de un examen físico. Pueden hacerle radiografías para confirmar el tipo de fractura.    ¿Cómo se trata?  El tratamiento de esta afección incluye hacer reposo, aplicar hielo y elevar (levantar) la chapin de la lesión por encima del nivel del corazón. Pueden administrarle medicamentos para ayudarlo a aliviar el dolor.    El tratamiento varía en función del tipo de fractura que tenga.  Para shun fractura de tipo 1, es posible que le den shun férula o un cabestrillo para impedir que el brazo y el codo se muevan jeanna varios días.  Para shun fractura de tipo 2, es posible que le den shun férula o un cabestrillo para impedir que el brazo y el codo se muevan jeanna varios días. Si el desplazamiento es más grave, podría necesitar shun cirugía.  Para shun fractura de tipo 3, por lo general necesitará shun cirugía para la extracción de las partes del hueso. Si el daño es grave, es posible que deban extirparle toda la carlos alberto del radio. La fractura se estabilizará con shun férula y un cabestrillo.  Siga estas instrucciones en cardoza casa:  Medicamentos    Use los medicamentos de venta yessy y los recetados solamente alla se lo haya indicado el médico.  Pregúntele al médico si el medicamento recetado:  Requiere que evite conducir o usar maquinaria.  Puede causarle estreñimiento. Es posible que tenga que mahesh estas medidas para prevenir o tratar el estreñimiento:  Beber suficiente líquido alla para mantener la orina de color amarillo pálido.  Usar medicamentos recetados o de venta yessy.  Consumir alimentos ricos en fibra, alla frijoles, cereales integrales, y frutas y verduras frescas.  Limitar el consumo de alimentos ricos en grasa y azúcares procesados, alla los alimentos fritos o dulces.  Si tiene shun férula o un cabestrillo extraíbles:    Use la férula o el cabestrillo alla se lo haya indicado el médico. Quíteselos solamente alla se lo haya indicado el médico.  Controle la piel alrededor de la férula o el cabestrillo todos los días. Informe al médico acerca de cualquier inquietud.  Afloje la férula o el cabestrillo si los dedos de la mano se le adormecen, siente hormigueos, o se le enfrían y se tornan de color mick.  Manténgalos limpios y secos.  Si tiene shun férula no extraíble:    No ejerza presión en ninguna parte de la férula hasta que se haya endurecido por completo. [place] puede tardar varias horas.  No introduzca nada adentro de la férula para rascarse la piel. [place] puede aumentar el riesgo de contraer shun infección.  Controle todos los edgar la piel alrededor de la férula. Informe al médico acerca de cualquier inquietud.  Puede aplicar shun loción en la piel seca alrededor de los bordes de la férula. No aplique loción en la piel por debajo de la férula.  Manténgala limpia y seca.  Bañarse    No tome prisca de inmersión, no nade ni use el jacuzzi hasta que el médico lo autorice. Pregúntele al médico si puede ducharse. Jerman vez solo le permitan darse prisca de esponja.  Si la férula o el cabestrillo no son impermeables:  No deje que se mojen.  Cúbralos con un envoltorio hermético cuando tome un baño de inmersión o shun ducha.  Control del dolor, la rigidez y la hinchazón    Bag of ice on a towel on the skin.  Si se lo indican, aplique hielo sobre la chapin de la lesión. Para hacer esto:  Si tiene shun férula o un cabestrillo desmontables, quíteselos alla se lo haya indicado el médico.  Ponga el hielo en shun bolsa plástica.  Coloque shun toalla entre la piel y la bolsa de hielo, o entre la férula y la bolsa de hielo.  Aplique el hielo jeanna 20 minutos, 2 o 3 veces por día.  Retire el hielo si la piel se pone de color varela brillante. [place] es muy importante. Si no puede sentir dolor, calor o frío, tiene un mayor riesgo de que se dañe la chapin.  Mueva los dedos con frecuencia para reducir la rigidez y la hinchazón.  Cuando esté sentado o acostado, levante (eleve) la chapin lesionada por encima del nivel del corazón.  Actividad    Pregúntele al médico cuándo puede volver a conducir si tiene un yeso, shun férula o un cabestrillo en la devyn.  No levante ningún objeto que pese más de 10 libras (4.5 kg) o que supere el límite de peso que le hayan indicado, hasta que el médico le diga que puede hacerlo.  Retome fuentes actividades normales según lo indicado por el médico. Pregúntele al médico qué actividades son seguras para usted.  Dale los ejercicios alla se lo haya indicado el médico o el fisioterapeuta.  Instrucciones generales    No consuma ningún producto que contenga nicotina o tabaco. Estos productos incluyen cigarrillos, tabaco para mascar y aparatos de vapeo, alla los cigarrillos electrónicos. Si necesita ayuda para dejar de fumar, consulte al médico.  Concurra a todas las visitas de seguimiento. [place] es importante.  Comuníquese con un médico si:  Tiene problemas con la férula.  Tiene dolor o hinchazón que empeoran.  Solicite ayuda de inmediato si:  Siente un dolor intenso, especialmente si el dolor cambia de manera significativa o de repente.  Tiene líquido o percibe mal olor que sale de la férula.  La mano o los dedos se le ponen fríos, o pálidos o de color mick.  Pierde la sensibilidad en cualquier parte de la mano o del brazo.  Resumen  La fractura de la carlos alberto del radio es shun rotura en tracy de los huesos del antebrazo (radio) cerca de la articulación del codo.  Esta afección a menudo ocurre al caerse sobre el brazo extendido.  Esta afección se puede tratar con reposo, hielo, elevación, analgésicos, un cabestrillo o shun férula, y cirugía. El tratamiento variará en función del tipo de fractura que tenga.  Esta información no tiene alla fin reemplazar el consejo del médico. Asegúrese de hacerle al médico cualquier pregunta que tenga.    Document Revised: 04/21/2022 Document Reviewed: 04/21/2022  [employer] Patient Education © 2024 [employer]  [employer] logo  Terms and Conditions  Privacy Policy  Editorial Policy  All content on this site: Copyright © 2024 Elsevier, its licensors, and contributors. All rights are reserved, including those for text and data mining, AI training, and similar technologies. For all open access content, the Creative Commons licensing terms apply.  Cookies are used by this site. To decline or learn more, visit our Cookies page.  [employer]

## 2024-08-11 NOTE — ED STATDOCS - PHYSICAL EXAMINATION
Tender on the medial side r elbow. Pain w/ supination of r forearm. Arm is held in flexion of the elbow.

## 2024-08-11 NOTE — ED STATDOCS - CLINICAL SUMMARY MEDICAL DECISION MAKING FREE TEXT BOX
24 y/o female w/ no PMHx. Pt is presenting to the ED c/o arm pain s/p mechanical slip and fall. Mechanical fall, R elbow/forearm , X-ray. with splint/sling and ortho referral

## 2024-08-11 NOTE — ED STATDOCS - PATIENT PORTAL LINK FT
You can access the FollowMyHealth Patient Portal offered by [facility] by registering at the following website: [email or web address]. By joining [employer]’s FollowMyHealth portal, you will also be able to view your health information using other applications (apps) compatible with our system.

## 2024-08-12 PROCEDURE — 73070 X-RAY EXAM OF ELBOW: CPT | Mod: 26,RT
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
## 2024-08-14 ENCOUNTER — TRANSCRIPTION ENCOUNTER (OUTPATIENT)
Age: 24
End: 2024-08-14